# Patient Record
Sex: FEMALE | Race: WHITE | Employment: UNEMPLOYED | URBAN - METROPOLITAN AREA
[De-identification: names, ages, dates, MRNs, and addresses within clinical notes are randomized per-mention and may not be internally consistent; named-entity substitution may affect disease eponyms.]

---

## 2024-08-09 ENCOUNTER — HOSPITAL ENCOUNTER (EMERGENCY)
Facility: HOSPITAL | Age: 29
Discharge: HOME | End: 2024-08-09
Attending: EMERGENCY MEDICINE
Payer: MEDICAID

## 2024-08-09 ENCOUNTER — APPOINTMENT (OUTPATIENT)
Dept: ULTRASOUND IMAGING | Facility: HOSPITAL | Age: 29
End: 2024-08-09
Attending: EMERGENCY MEDICINE
Payer: MEDICAID

## 2024-08-09 ENCOUNTER — APPOINTMENT (OUTPATIENT)
Dept: CT IMAGING | Facility: HOSPITAL | Age: 29
End: 2024-08-09
Attending: EMERGENCY MEDICINE
Payer: MEDICAID

## 2024-08-09 VITALS
SYSTOLIC BLOOD PRESSURE: 107 MMHG | HEART RATE: 83 BPM | DIASTOLIC BLOOD PRESSURE: 79 MMHG | BODY MASS INDEX: 27.97 KG/M2 | TEMPERATURE: 97 F | RESPIRATION RATE: 16 BRPM | OXYGEN SATURATION: 99 % | WEIGHT: 152 LBS | HEIGHT: 62 IN

## 2024-08-09 DIAGNOSIS — N83.202 CYST OF LEFT OVARY: Primary | ICD-10-CM

## 2024-08-09 LAB
ALBUMIN SERPL-MCNC: 4.9 G/DL (ref 3.2–4.8)
ALBUMIN/GLOB SERPL: 1.6 {RATIO} (ref 1–2)
ALP LIVER SERPL-CCNC: 94 U/L
ALT SERPL-CCNC: 37 U/L
ANION GAP SERPL CALC-SCNC: 7 MMOL/L (ref 0–18)
AST SERPL-CCNC: 37 U/L (ref ?–34)
B-HCG UR QL: NEGATIVE
BASOPHILS # BLD AUTO: 0.06 X10(3) UL (ref 0–0.2)
BASOPHILS NFR BLD AUTO: 0.7 %
BILIRUB SERPL-MCNC: 0.3 MG/DL (ref 0.3–1.2)
BILIRUB UR QL STRIP.AUTO: NEGATIVE
BUN BLD-MCNC: 11 MG/DL (ref 9–23)
CALCIUM BLD-MCNC: 9.9 MG/DL (ref 8.7–10.4)
CHLORIDE SERPL-SCNC: 106 MMOL/L (ref 98–112)
CLARITY UR REFRACT.AUTO: CLEAR
CO2 SERPL-SCNC: 24 MMOL/L (ref 21–32)
COLOR UR AUTO: YELLOW
CREAT BLD-MCNC: 0.78 MG/DL
EGFRCR SERPLBLD CKD-EPI 2021: 104 ML/MIN/1.73M2 (ref 60–?)
EOSINOPHIL # BLD AUTO: 0.07 X10(3) UL (ref 0–0.7)
EOSINOPHIL NFR BLD AUTO: 0.8 %
ERYTHROCYTE [DISTWIDTH] IN BLOOD BY AUTOMATED COUNT: 13 %
GLOBULIN PLAS-MCNC: 3 G/DL (ref 2–3.5)
GLUCOSE BLD-MCNC: 92 MG/DL (ref 70–99)
GLUCOSE UR STRIP.AUTO-MCNC: NORMAL MG/DL
HCT VFR BLD AUTO: 37.8 %
HGB BLD-MCNC: 12.9 G/DL
IMM GRANULOCYTES # BLD AUTO: 0.02 X10(3) UL (ref 0–1)
IMM GRANULOCYTES NFR BLD: 0.2 %
KETONES UR STRIP.AUTO-MCNC: NEGATIVE MG/DL
LEUKOCYTE ESTERASE UR QL STRIP.AUTO: 75
LYMPHOCYTES # BLD AUTO: 2.96 X10(3) UL (ref 1–4)
LYMPHOCYTES NFR BLD AUTO: 34.9 %
MCH RBC QN AUTO: 29.7 PG (ref 26–34)
MCHC RBC AUTO-ENTMCNC: 34.1 G/DL (ref 31–37)
MCV RBC AUTO: 86.9 FL
MONOCYTES # BLD AUTO: 0.55 X10(3) UL (ref 0.1–1)
MONOCYTES NFR BLD AUTO: 6.5 %
NEUTROPHILS # BLD AUTO: 4.82 X10 (3) UL (ref 1.5–7.7)
NEUTROPHILS # BLD AUTO: 4.82 X10(3) UL (ref 1.5–7.7)
NEUTROPHILS NFR BLD AUTO: 56.9 %
NITRITE UR QL STRIP.AUTO: NEGATIVE
OSMOLALITY SERPL CALC.SUM OF ELEC: 283 MOSM/KG (ref 275–295)
PH UR STRIP.AUTO: 5.5 [PH] (ref 5–8)
PLATELET # BLD AUTO: 342 10(3)UL (ref 150–450)
POTASSIUM SERPL-SCNC: 4.4 MMOL/L (ref 3.5–5.1)
PROT SERPL-MCNC: 7.9 G/DL (ref 5.7–8.2)
PROT UR STRIP.AUTO-MCNC: 20 MG/DL
RBC # BLD AUTO: 4.35 X10(6)UL
SODIUM SERPL-SCNC: 137 MMOL/L (ref 136–145)
SP GR UR STRIP.AUTO: 1.03 (ref 1–1.03)
UROBILINOGEN UR STRIP.AUTO-MCNC: NORMAL MG/DL
WBC # BLD AUTO: 8.5 X10(3) UL (ref 4–11)

## 2024-08-09 PROCEDURE — 81025 URINE PREGNANCY TEST: CPT

## 2024-08-09 PROCEDURE — 99285 EMERGENCY DEPT VISIT HI MDM: CPT

## 2024-08-09 PROCEDURE — 96374 THER/PROPH/DIAG INJ IV PUSH: CPT

## 2024-08-09 PROCEDURE — 93975 VASCULAR STUDY: CPT | Performed by: EMERGENCY MEDICINE

## 2024-08-09 PROCEDURE — 87086 URINE CULTURE/COLONY COUNT: CPT | Performed by: EMERGENCY MEDICINE

## 2024-08-09 PROCEDURE — 76856 US EXAM PELVIC COMPLETE: CPT | Performed by: EMERGENCY MEDICINE

## 2024-08-09 PROCEDURE — 74177 CT ABD & PELVIS W/CONTRAST: CPT | Performed by: EMERGENCY MEDICINE

## 2024-08-09 PROCEDURE — 80053 COMPREHEN METABOLIC PANEL: CPT | Performed by: EMERGENCY MEDICINE

## 2024-08-09 PROCEDURE — 81001 URINALYSIS AUTO W/SCOPE: CPT | Performed by: EMERGENCY MEDICINE

## 2024-08-09 PROCEDURE — 76830 TRANSVAGINAL US NON-OB: CPT | Performed by: EMERGENCY MEDICINE

## 2024-08-09 PROCEDURE — 85025 COMPLETE CBC W/AUTO DIFF WBC: CPT | Performed by: EMERGENCY MEDICINE

## 2024-08-09 RX ORDER — KETOROLAC TROMETHAMINE 30 MG/ML
30 INJECTION, SOLUTION INTRAMUSCULAR; INTRAVENOUS ONCE
Status: COMPLETED | OUTPATIENT
Start: 2024-08-09 | End: 2024-08-09

## 2024-08-09 RX ORDER — NAPROXEN 500 MG/1
500 TABLET ORAL 2 TIMES DAILY PRN
Qty: 20 TABLET | Refills: 0 | Status: SHIPPED | OUTPATIENT
Start: 2024-08-09 | End: 2024-08-19

## 2024-08-09 RX ORDER — HYDROXYZINE HYDROCHLORIDE 25 MG/1
25 TABLET, FILM COATED ORAL 3 TIMES DAILY PRN
COMMUNITY

## 2024-08-09 RX ORDER — SERTRALINE HYDROCHLORIDE 100 MG/1
100 TABLET, FILM COATED ORAL DAILY
COMMUNITY

## 2024-08-09 NOTE — ED PROVIDER NOTES
Patient Seen in: [facility] Emergency Department      History     Chief Complaint   Patient presents with    Back Pain    Eval-G     Stated Complaint: lower back pain and pelvis pain    Subjective:   HPI    Patient is a 29-year-old female presents emergency room with history of lower back pain and left-sided pelvic pain and left lower quadrant abdominal pain ongoing for the last 4 days.  The patient was seen at the immediate care yesterday and was told to come to the ER for evaluation but did not.  The patient's pain is well localized to the left lower quadrant at this time.  Patient does have some discomfort with urination as well.  The patient denies history of any fever.  The patient denies vomiting or diarrhea.  The patient denies history of any fall or trauma to the area.  The patient has had no previous abdominal surgeries.  Patient denies history of any other somatic complaints or discomfort at this time.    Objective:   History reviewed. No pertinent past medical history.           History reviewed. No pertinent surgical history.             Social History     Socioeconomic History    Marital status: Single   Tobacco Use    Smoking status: Never    Smokeless tobacco: Never   Substance and Sexual Activity    Alcohol use: Not Currently    Drug use: Never     Social Determinants of Health      Received from [facility]    Social Connections    Received from [facility]    Housing Stability              Review of Systems    Positive for stated Chief Complaint: Back Pain and Eval-G    Other systems are as noted in HPI.  Constitutional and vital signs reviewed.      All other systems reviewed and negative except as noted above.    Physical Exam     ED Triage Vitals [08/09/24 1041]   /89   Pulse 93   Resp 16   Temp 97.3 °F (36.3 °C)   Temp src    SpO2 100 %   O2 Device None (Room air)       Current Vitals:   Vital Signs  BP: 123/88  Pulse: 75  Resp: 16  Temp: 97.3  °F (36.3 °C)  MAP (mmHg): 99    Oxygen Therapy  SpO2: 100 %  O2 Device: None (Room air)            Physical Exam  GENERAL: Well-developed, well-nourished female sitting up breathing easily in no apparent distress.  Patient is nontoxic in appearance.  HEENT: Head is normocephalic, atraumatic. Pupils are 4 mm equally round and reactive to light. Oropharynx is clear. Mucous membranes are moist.  LUNGS: Clear to auscultation bilaterally with no wheeze. There is good equal air entry bilaterally.  HEART: Regular rate and rhythm. Normal S1, S2 no S3, or S4. No murmur.  ABDOMEN: There is mild focal tenderness to palpation appreciated to the left lower quadrant only with no other focal tenderness to palpation appreciated. There is no guarding, no rebound, no mass, and no organomegaly appreciated. There is normoactive bowel sounds.   EXTREMITIES: There is no cyanosis, clubbing, or edema appreciated. Pulses are 2+ and equal in all 4 extremities.  NEURO: Patient is awake, alert and oriented to time place and person. Motor strength is 5 over 5 in all 4 extremities. There are no gross motor or sensory deficits appreciated.  Patient answering all questions appropriately.         ED Course     Labs Reviewed   COMP METABOLIC PANEL (14) - Abnormal; Notable for the following components:       Result Value    AST 37 (*)     Albumin 4.9 (*)     All other components within normal limits   URINALYSIS WITH CULTURE REFLEX - Abnormal; Notable for the following components:    Blood Urine Trace (*)     Protein Urine 20 (*)     Leukocyte Esterase Urine 75 (*)     Squamous Epi. Cells Few (*)     All other components within normal limits   POCT PREGNANCY URINE - Normal   CBC WITH DIFFERENTIAL WITH PLATELET   RAINBOW DRAW LAVENDER   RAINBOW DRAW LIGHT GREEN   RAINBOW DRAW BLUE   URINE CULTURE, ROUTINE   I personally reviewed the patient's CT scan of the abdomen pelvis images and my individual interpretation shows no evidence of any acute bowel  obstruction or free air.  I also reviewed the official radiology report which showed results as noted below.          CT ABDOMEN+PELVIS(CONTRAST ONLY)(CPT=74177)    Result Date: 8/9/2024  CONCLUSION:  1. Left adnexal cyst measures up to 7.2 cm. Dedicated ultrasound of the pelvis may be obtained for further evaluation. 2. Severe diffuse hepatic steatosis is noted with hepatomegaly.   LOCATION:  [facility]   Dictated by (CST): Sunil Lema MD on 8/09/2024 at 1:07 PM     Finalized by (CST): Sunil Lema MD on 8/09/2024 at 1:09 PM          Ultrasound of the pelvis is pending at the time of dictation.         MDM      13:28 patient sitting back and breathing easily in no apparent distress this time.  Patient with no other new complaints at this time appears well on repeat examination at this time.  Patient is feeling better after medication given here in the patient is awaiting ultrasound to be done at this time.  The patient's case will be endorsed my colleague, Dr. Ferrer who will follow ultrasound results and will further disposition the patient based on ongoing observation in the ER.  Patient's case will be discussed with Dr. García from gynecology prior to discharge.      Patient an IV line established blood work drawn including a CBC, chemistries, BUN/creatinine, and blood sugar all of which are unremarkable.  Liver function test are negative.  The patient had evidence of blood in the urine no other acute abnormalities noted.  The patient is not pregnant.  Patient underwent CT scan with results as noted above.  Patient currently has an ultrasound of the pelvis which is pending at the time of this dictation.  Patient was given IV fluid and IV Toradol in the emergency room with improvement in symptoms after this was given.  Patient is currently well-appearing on repeat examination.  Patient does not have a gynecologist that she sees at this time.  She will need follow-up with gynecology at this time.  Patient's case  will be endorsed to my colleague, Dr. Ferrer at the end of my shift will follow the patient's ultrasound and further disposition the patient based on ongoing observation here the patient is awaiting final disposition at this time.                             Medical Decision Making      Disposition and Plan     Clinical Impression:  1. Cyst of left ovary         Disposition:  There is no disposition on file for this visit.  There is no disposition time on file for this visit.    Follow-up:  Ruba Perez MD  [address] [place] [address]   [address] [address] [phone]    Follow up in 2 day(s)  please call for close follow up          Medications Prescribed:  Current Discharge Medication List

## 2024-08-09 NOTE — ED PROVIDER NOTES
Sign Out Note    Received sign out from: Dr. Rodriguez    Dispo: Pending pelvic US    HPI:   30 yo F here with L lower back and L pelvic pain for 4 days  Gyn on consult.       ED Course as of 08/10/24 0101  ------------------------------------------------------------  Time: 08/09 1555  Comment: US shows normal appearance of L ovary, no torsion. Unable to visualize  R ovary due to bowel gas. L ovary with simple cyst.  ------------------------------------------------------------  Time: 08/09 1659  Comment: Patient re-evaluated. She has not had any R sided abdominal pain, no RLQ TTP. Although pelvic US could not visualize R ovary, suspect pain is most related to large left ovary cyst. There is no ovarian torsion on left side. I discussed this with patient at bedside. Discussed strict return precautions, supportive care. Prescribed course of naproxen. Recommended close f/u with PCP and OB/GYN. Pt verbalizes understanding and agreement of plan             Citlalli Ferrer DO  Emergency Medicine Physician

## 2024-08-09 NOTE — ED INITIAL ASSESSMENT (HPI)
Pt present to ED with c/o of lower back pain and left sided pelvis pain x4 days.  Pt went UC yesterday and was told to come to the ED. Pt reports pain, burning,  urgency and frequency with urination. Pt states pain is a 9/10 on the pain scale when she urinates. Denies fevers. Denies NVD. Hx PCOS.

## 2024-08-09 NOTE — DISCHARGE INSTRUCTIONS
You were found to have a large left sided ovarian cyst. You can take naproxen twice daily for pain. Do not take ibuprofen with this. You can take Tylenol as well every 6-8 hours. Apply heat to the painful area. Return to the ER if you develop worsening pain or sudden onset pain, vomiting, fevers, chills, or any other new concerns or worsening symptoms. Call OB/GYN to make close f/u appointment and also follow up with a primary care physician